# Patient Record
Sex: FEMALE | Race: WHITE | NOT HISPANIC OR LATINO | Employment: UNEMPLOYED | URBAN - NONMETROPOLITAN AREA
[De-identification: names, ages, dates, MRNs, and addresses within clinical notes are randomized per-mention and may not be internally consistent; named-entity substitution may affect disease eponyms.]

---

## 2017-06-22 ENCOUNTER — TRANSCRIBE ORDERS (OUTPATIENT)
Dept: MAMMOGRAPHY | Facility: HOSPITAL | Age: 67
End: 2017-06-22

## 2017-06-22 DIAGNOSIS — Z13.9 SCREENING: Primary | ICD-10-CM

## 2017-07-12 ENCOUNTER — HOSPITAL ENCOUNTER (OUTPATIENT)
Dept: MAMMOGRAPHY | Facility: HOSPITAL | Age: 67
Discharge: HOME | End: 2017-07-12
Admitting: NURSE PRACTITIONER

## 2017-07-12 DIAGNOSIS — Z13.9 SCREENING: ICD-10-CM

## 2017-07-12 PROCEDURE — G0202 SCR MAMMO BI INCL CAD: HCPCS

## 2017-07-12 PROCEDURE — 77063 BREAST TOMOSYNTHESIS BI: CPT

## 2017-10-30 ENCOUNTER — TRANSCRIBE ORDERS (OUTPATIENT)
Dept: GENERAL RADIOLOGY | Facility: HOSPITAL | Age: 67
End: 2017-10-30

## 2017-10-30 DIAGNOSIS — N95.1 MENOPAUSAL STATE: Primary | ICD-10-CM

## 2017-11-09 ENCOUNTER — APPOINTMENT (OUTPATIENT)
Dept: BONE DENSITY | Facility: HOSPITAL | Age: 67
End: 2017-11-09

## 2017-11-09 DIAGNOSIS — N95.1 MENOPAUSAL STATE: ICD-10-CM

## 2017-11-09 PROCEDURE — 77080 DXA BONE DENSITY AXIAL: CPT

## 2018-05-29 ENCOUNTER — TELEPHONE (OUTPATIENT)
Dept: SURGERY | Facility: CLINIC | Age: 68
End: 2018-05-29

## 2018-06-14 ENCOUNTER — PREP FOR SURGERY (OUTPATIENT)
Dept: OTHER | Facility: HOSPITAL | Age: 68
End: 2018-06-14

## 2018-06-14 DIAGNOSIS — Z12.11 COLON CANCER SCREENING: Primary | ICD-10-CM

## 2018-06-14 RX ORDER — SODIUM CHLORIDE 0.9 % (FLUSH) 0.9 %
1-10 SYRINGE (ML) INJECTION AS NEEDED
Status: CANCELLED | OUTPATIENT
Start: 2018-06-14

## 2018-06-14 RX ORDER — POLYETHYLENE GLYCOL 3350 17 G/17G
238 POWDER, FOR SOLUTION ORAL ONCE
Qty: 238 G | Refills: 0 | Status: SHIPPED | OUTPATIENT
Start: 2018-06-14 | End: 2018-06-15

## 2018-06-14 RX ORDER — SODIUM CHLORIDE, SODIUM LACTATE, POTASSIUM CHLORIDE, CALCIUM CHLORIDE 600; 310; 30; 20 MG/100ML; MG/100ML; MG/100ML; MG/100ML
50 INJECTION, SOLUTION INTRAVENOUS CONTINUOUS
Status: CANCELLED | OUTPATIENT
Start: 2018-06-14

## 2018-06-14 RX ORDER — BISACODYL 5 MG/1
TABLET, DELAYED RELEASE ORAL
Qty: 4 TABLET | Refills: 0 | Status: SHIPPED | OUTPATIENT
Start: 2018-06-14 | End: 2018-06-18 | Stop reason: HOSPADM

## 2018-06-15 PROBLEM — Z12.11 COLON CANCER SCREENING: Status: ACTIVE | Noted: 2018-06-15

## 2018-06-18 ENCOUNTER — ANESTHESIA EVENT (OUTPATIENT)
Dept: GASTROENTEROLOGY | Facility: HOSPITAL | Age: 68
End: 2018-06-18

## 2018-06-18 ENCOUNTER — ANESTHESIA (OUTPATIENT)
Dept: GASTROENTEROLOGY | Facility: HOSPITAL | Age: 68
End: 2018-06-18

## 2018-06-18 ENCOUNTER — HOSPITAL ENCOUNTER (OUTPATIENT)
Facility: HOSPITAL | Age: 68
Setting detail: HOSPITAL OUTPATIENT SURGERY
Discharge: HOME | End: 2018-06-18
Attending: SURGERY | Admitting: SURGERY

## 2018-06-18 VITALS
HEIGHT: 65 IN | TEMPERATURE: 97.9 F | DIASTOLIC BLOOD PRESSURE: 72 MMHG | RESPIRATION RATE: 18 BRPM | HEART RATE: 63 BPM | WEIGHT: 197 LBS | SYSTOLIC BLOOD PRESSURE: 125 MMHG | OXYGEN SATURATION: 96 % | BODY MASS INDEX: 32.82 KG/M2

## 2018-06-18 DIAGNOSIS — Z12.11 COLON CANCER SCREENING: ICD-10-CM

## 2018-06-18 LAB — GLUCOSE BLDC GLUCOMTR-MCNC: 119 MG/DL (ref 70–130)

## 2018-06-18 PROCEDURE — 25010000002 PROPOFOL 200 MG/20ML EMULSION: Performed by: NURSE ANESTHETIST, CERTIFIED REGISTERED

## 2018-06-18 PROCEDURE — G0105 COLORECTAL SCRN; HI RISK IND: HCPCS | Performed by: SURGERY

## 2018-06-18 PROCEDURE — S0260 H&P FOR SURGERY: HCPCS | Performed by: SURGERY

## 2018-06-18 PROCEDURE — 82962 GLUCOSE BLOOD TEST: CPT

## 2018-06-18 RX ORDER — SODIUM CHLORIDE 0.9 % (FLUSH) 0.9 %
1-10 SYRINGE (ML) INJECTION AS NEEDED
Status: DISCONTINUED | OUTPATIENT
Start: 2018-06-18 | End: 2018-06-18 | Stop reason: HOSPADM

## 2018-06-18 RX ORDER — SODIUM CHLORIDE, SODIUM LACTATE, POTASSIUM CHLORIDE, CALCIUM CHLORIDE 600; 310; 30; 20 MG/100ML; MG/100ML; MG/100ML; MG/100ML
50 INJECTION, SOLUTION INTRAVENOUS CONTINUOUS
Status: DISCONTINUED | OUTPATIENT
Start: 2018-06-18 | End: 2018-06-18 | Stop reason: HOSPADM

## 2018-06-18 RX ORDER — PROPOFOL 10 MG/ML
INJECTION, EMULSION INTRAVENOUS AS NEEDED
Status: DISCONTINUED | OUTPATIENT
Start: 2018-06-18 | End: 2018-06-18 | Stop reason: SURG

## 2018-06-18 RX ADMIN — PROPOFOL 30 MG: 10 INJECTION, EMULSION INTRAVENOUS at 13:05

## 2018-06-18 RX ADMIN — PROPOFOL 30 MG: 10 INJECTION, EMULSION INTRAVENOUS at 13:02

## 2018-06-18 RX ADMIN — PROPOFOL 30 MG: 10 INJECTION, EMULSION INTRAVENOUS at 12:56

## 2018-06-18 RX ADMIN — PROPOFOL 30 MG: 10 INJECTION, EMULSION INTRAVENOUS at 12:47

## 2018-06-18 RX ADMIN — PROPOFOL 30 MG: 10 INJECTION, EMULSION INTRAVENOUS at 12:38

## 2018-06-18 RX ADMIN — PROPOFOL 30 MG: 10 INJECTION, EMULSION INTRAVENOUS at 12:52

## 2018-06-18 RX ADMIN — PROPOFOL 30 MG: 10 INJECTION, EMULSION INTRAVENOUS at 12:44

## 2018-06-18 RX ADMIN — PROPOFOL 30 MG: 10 INJECTION, EMULSION INTRAVENOUS at 12:59

## 2018-06-18 RX ADMIN — PROPOFOL 30 MG: 10 INJECTION, EMULSION INTRAVENOUS at 12:41

## 2018-06-18 RX ADMIN — PROPOFOL 30 MG: 10 INJECTION, EMULSION INTRAVENOUS at 12:42

## 2018-06-18 RX ADMIN — PROPOFOL 30 MG: 10 INJECTION, EMULSION INTRAVENOUS at 12:45

## 2018-06-18 RX ADMIN — PROPOFOL 30 MG: 10 INJECTION, EMULSION INTRAVENOUS at 12:46

## 2018-06-18 RX ADMIN — SODIUM CHLORIDE, POTASSIUM CHLORIDE, SODIUM LACTATE AND CALCIUM CHLORIDE 50 ML/HR: 600; 310; 30; 20 INJECTION, SOLUTION INTRAVENOUS at 11:18

## 2018-06-18 RX ADMIN — PROPOFOL 30 MG: 10 INJECTION, EMULSION INTRAVENOUS at 12:40

## 2018-06-18 RX ADMIN — PROPOFOL 30 MG: 10 INJECTION, EMULSION INTRAVENOUS at 12:49

## 2018-06-18 RX ADMIN — PROPOFOL 30 MG: 10 INJECTION, EMULSION INTRAVENOUS at 12:43

## 2018-06-18 NOTE — ANESTHESIA POSTPROCEDURE EVALUATION
Patient: Annette Seo    Procedure Summary     Date:  06/18/18 Room / Location:  [facility] ENDOSCOPY 3 / [facility] ENDOSCOPY    Anesthesia Start:  1234 Anesthesia Stop:  1311    Procedure:  COLONOSCOPY (N/A ) Diagnosis:       Colon cancer screening      (Colon cancer screening [Z12.11])    Surgeon:  Priti Bell MD Provider:  Clifton Melchor CRNA    Anesthesia Type:  MAC ASA Status:  3          Anesthesia Type: MAC  Last vitals  BP   118/70 (06/18/18 1112)   Temp   97.8 °F (36.6 °C) (06/18/18 1112)   Pulse   65 (06/18/18 1112)   Resp   18 (06/18/18 1112)     SpO2   98 % (06/18/18 1112)     Post Anesthesia Care and Evaluation    Patient location during evaluation: PHASE II  Patient participation: complete - patient participated  Level of consciousness: awake and alert  Pain management: satisfactory to patient  Airway patency: patent  Anesthetic complications: No anesthetic complications    Cardiovascular status: acceptable and stable  Respiratory status: acceptable  Hydration status: acceptable

## 2018-06-18 NOTE — ANESTHESIA POSTPROCEDURE EVALUATION
Patient: Annette Seo    Procedure Summary     Date:  06/18/18 Room / Location:  [facility] ENDOSCOPY 3 / [facility] ENDOSCOPY    Anesthesia Start:  1234 Anesthesia Stop:  1311    Procedure:  COLONOSCOPY (N/A ) Diagnosis:       Colon cancer screening      (Colon cancer screening [Z12.11])    Surgeon:  Priti Bell MD Provider:  Clifton Melchor CRNA    Anesthesia Type:  MAC ASA Status:  3          Anesthesia Type: MAC  Last vitals  BP   118/70 (06/18/18 1112)   Temp   97.8 °F (36.6 °C) (06/18/18 1112)   Pulse   65 (06/18/18 1112)   Resp   18 (06/18/18 1112)     SpO2   98 % (06/18/18 1112)     Anesthesia Post Evaluation

## 2018-06-18 NOTE — ANESTHESIA PREPROCEDURE EVALUATION
Anesthesia Evaluation     Patient summary reviewed and Nursing notes reviewed   no history of anesthetic complications:  NPO Solid Status: > 8 hours  NPO Liquid Status: > 8 hours           Airway   Dental      Pulmonary    (+) a smoker,   Cardiovascular     PT is on anticoagulation therapy  Patient on routine beta blocker    (+) hyperlipidemia,       Neuro/Psych  (+) neuromuscular disease, psychiatric history Anxiety and Depression,     GI/Hepatic/Renal/Endo    (+)  hiatal hernia, GERD,  diabetes mellitus,     Musculoskeletal     Abdominal    Substance History      OB/GYN          Other                        Anesthesia Plan    ASA 3     MAC     intravenous induction

## 2018-07-10 ENCOUNTER — TRANSCRIBE ORDERS (OUTPATIENT)
Dept: ADMINISTRATIVE | Facility: HOSPITAL | Age: 68
End: 2018-07-10

## 2018-07-10 DIAGNOSIS — Z12.39 BREAST CANCER SCREENING: Primary | ICD-10-CM

## 2018-08-15 ENCOUNTER — HOSPITAL ENCOUNTER (OUTPATIENT)
Dept: MAMMOGRAPHY | Facility: HOSPITAL | Age: 68
Discharge: HOME | End: 2018-08-15
Admitting: NURSE PRACTITIONER

## 2018-08-15 DIAGNOSIS — Z12.39 BREAST CANCER SCREENING: ICD-10-CM

## 2018-08-15 PROCEDURE — 77063 BREAST TOMOSYNTHESIS BI: CPT

## 2018-08-15 PROCEDURE — 77067 SCR MAMMO BI INCL CAD: CPT

## 2019-10-21 ENCOUNTER — TRANSCRIBE ORDERS (OUTPATIENT)
Dept: ADMINISTRATIVE | Facility: HOSPITAL | Age: 69
End: 2019-10-21

## 2019-10-21 DIAGNOSIS — Z12.39 SCREENING FOR BREAST CANCER: Primary | ICD-10-CM

## 2019-10-22 ENCOUNTER — HOSPITAL ENCOUNTER (OUTPATIENT)
Dept: MAMMOGRAPHY | Facility: HOSPITAL | Age: 69
Discharge: HOME | End: 2019-10-22
Admitting: NURSE PRACTITIONER

## 2019-10-22 DIAGNOSIS — Z12.39 SCREENING FOR BREAST CANCER: ICD-10-CM

## 2019-10-22 PROCEDURE — 77063 BREAST TOMOSYNTHESIS BI: CPT

## 2019-10-22 PROCEDURE — 77067 SCR MAMMO BI INCL CAD: CPT

## 2020-11-02 ENCOUNTER — TRANSCRIBE ORDERS (OUTPATIENT)
Dept: ADMINISTRATIVE | Facility: HOSPITAL | Age: 70
End: 2020-11-02

## 2020-11-02 DIAGNOSIS — Z12.31 SCREENING MAMMOGRAM, ENCOUNTER FOR: Primary | ICD-10-CM

## 2020-12-18 ENCOUNTER — TRANSCRIBE ORDERS (OUTPATIENT)
Dept: ADMINISTRATIVE | Facility: HOSPITAL | Age: 70
End: 2020-12-18

## 2020-12-18 DIAGNOSIS — Z12.31 VISIT FOR SCREENING MAMMOGRAM: Primary | ICD-10-CM

## 2021-02-09 ENCOUNTER — HOSPITAL ENCOUNTER (OUTPATIENT)
Dept: MAMMOGRAPHY | Facility: HOSPITAL | Age: 71
Discharge: HOME | End: 2021-02-09
Admitting: NURSE PRACTITIONER

## 2021-02-09 DIAGNOSIS — Z12.31 VISIT FOR SCREENING MAMMOGRAM: ICD-10-CM

## 2021-02-09 PROCEDURE — 77067 SCR MAMMO BI INCL CAD: CPT

## 2021-02-09 PROCEDURE — 77063 BREAST TOMOSYNTHESIS BI: CPT

## 2022-01-14 ENCOUNTER — PREP FOR SURGERY (OUTPATIENT)
Dept: OTHER | Facility: HOSPITAL | Age: 72
End: 2022-01-14

## 2022-01-14 ENCOUNTER — TELEPHONE (OUTPATIENT)
Dept: SURGERY | Facility: CLINIC | Age: 72
End: 2022-01-14

## 2022-01-14 DIAGNOSIS — Z12.11 COLON CANCER SCREENING: Primary | ICD-10-CM

## 2022-01-14 RX ORDER — POLYETHYLENE GLYCOL 3350 17 G/17G
POWDER, FOR SOLUTION ORAL
Qty: 238 G | Refills: 0 | Status: SHIPPED | OUTPATIENT
Start: 2022-01-14

## 2022-01-14 RX ORDER — BISACODYL 5 MG
TABLET, DELAYED RELEASE (ENTERIC COATED) ORAL
Qty: 4 TABLET | Refills: 0 | Status: SHIPPED | OUTPATIENT
Start: 2022-01-14

## 2022-01-14 NOTE — TELEPHONE ENCOUNTER
PRESCREENING FOR OPEN ACCESS SCHEDULING    Annette Seo, 1950  [id number]    01/14/22    If, the patient answers yes to any of the following questions the provider will be informed prior to scheduling open access for approval and documented in the chart.    [x]  Yes  [] No    1. Have you ever had a colonoscopy in the past?      When:  2018      Where:       Polyps or other:     []  Yes  [x] No    2. Family history of colon cancer?      Relation:       Age of onset:       Do you currently have any of the following?    []  Yes  [x] No  Rectal bleeding, if so, how long?     []  Yes  [x] No  Abdominal pain, if so, how long?    []  Yes  [x] No  Constipation, if so, how long?    []  Yes  [x] No  Diarrhea, if so, how long?    []  Yes  [x] No  Weight loss, is so, how much?    [] Yes  [x] No  Small caliber stool, if so, how long?      Have you ever had any of the following conditions?    [] Yes  [x] No  Heart attack?      When?       Last cardiac workup?     Blood thinners?    [] Yes  [x] No   Lung problems, asthma or COPD?  [] Yes  [x] No  Oxygen required?       [] Yes  [x] No  Stroke?     [] Yes  [x] No  Have you ever had a reaction to anesthesia?

## 2022-01-14 NOTE — TELEPHONE ENCOUNTER
Pt scheduled at [facility] on 1/31/22 for colonoscopy. Instructions mailed, prep sent in.

## 2022-01-25 ENCOUNTER — TELEPHONE (OUTPATIENT)
Dept: SURGERY | Facility: CLINIC | Age: 72
End: 2022-01-25

## 2022-01-25 NOTE — TELEPHONE ENCOUNTER
waylon for the pt to call and reschedule her colonoscopy to [facility] or [facility]

## 2022-01-28 ENCOUNTER — TRANSCRIBE ORDERS (OUTPATIENT)
Dept: ADMINISTRATIVE | Facility: HOSPITAL | Age: 72
End: 2022-01-28

## 2022-01-28 DIAGNOSIS — Z12.31 ENCOUNTER FOR SCREENING MAMMOGRAM FOR BREAST CANCER: Primary | ICD-10-CM

## 2022-02-25 ENCOUNTER — HOSPITAL ENCOUNTER (OUTPATIENT)
Dept: MAMMOGRAPHY | Facility: HOSPITAL | Age: 72
Discharge: HOME | End: 2022-02-25
Admitting: NURSE PRACTITIONER

## 2022-02-25 DIAGNOSIS — Z12.31 ENCOUNTER FOR SCREENING MAMMOGRAM FOR BREAST CANCER: ICD-10-CM

## 2022-02-25 PROCEDURE — 77067 SCR MAMMO BI INCL CAD: CPT

## 2022-02-25 PROCEDURE — 77063 BREAST TOMOSYNTHESIS BI: CPT

## 2022-03-02 ENCOUNTER — TELEPHONE (OUTPATIENT)
Dept: SURGERY | Facility: CLINIC | Age: 72
End: 2022-03-02

## 2022-03-03 NOTE — PRE-PROCEDURE INSTRUCTIONS
PAT phone history completed with pt for upcoming procedure on 3/7/22 with Dr. Dobbins.    PAT PASS GIVEN/REVIEWED WITH PT.  VERBALIZED UNDERSTANDING OF THE FOLLOWING:  DO NOT EAT, DRINK, SMOKE, USE SMOKELESS TOBACCO OR CHEW GUM AFTER MIDNIGHT THE NIGHT BEFORE SURGERY.  THIS ALSO INCLUDES HARD CANDIES AND MINTS.    DO NOT SHAVE THE AREA TO BE OPERATED ON AT LEAST 48 HOURS PRIOR TO THE PROCEDURE.  DO NOT WEAR MAKE UP OR NAIL POLISH.  DO NOT LEAVE IN ANY PIERCING OR WEAR JEWELRY THE DAY OF SURGERY.      DO NOT USE ADHESIVES IF YOU WEAR DENTURES.    DO NOT WEAR EYE CONTACTS; BRING IN YOUR GLASSES.    ONLY TAKE MEDICATION THE MORNING OF YOUR PROCEDURE IF INSTRUCTED BY YOUR SURGEON WITH ENOUGH WATER TO SWALLOW THE MEDICATION.  IF YOUR SURGEON DID NOT SPECIFY WHICH MEDICATIONS TO TAKE, YOU WILL NEED TO CALL THEIR OFFICE FOR FURTHER INSTRUCTIONS AND DO AS THEY INSTRUCT.    LEAVE ANYTHING YOU CONSIDER VALUABLE AT HOME.    YOU WILL NEED TO ARRANGE FOR SOMEONE TO DRIVE YOU HOME AFTER SURGERY.  IT IS RECOMMENDED THAT YOU DO NOT DRIVE, WORK, DRINK ALCOHOL OR MAKE MAJOR DECISIONS FOR AT LEAST 24 HOURS AFTER YOUR PROCEDURE IS COMPLETE.      THE DAY OF YOUR PROCEDURE, BRING IN THE FOLLOWING IF APPLICABLE:   PICTURE ID AND INSURANCE/MEDICARE OR MEDICAID CARDS/ANY CO-PAY THAT MAY BE DUE   COPY OF ADVANCED DIRECTIVE/LIVING WILL/POWER OR    CPAP/BIPAP/INHALERS   SKIN PREP SHEET   YOUR PREADMISSION TESTING PASS (IF NOT A PHONE HISTORY)

## 2022-03-07 ENCOUNTER — ANESTHESIA (OUTPATIENT)
Dept: GASTROENTEROLOGY | Facility: HOSPITAL | Age: 72
End: 2022-03-07

## 2022-03-07 ENCOUNTER — ANESTHESIA EVENT (OUTPATIENT)
Dept: GASTROENTEROLOGY | Facility: HOSPITAL | Age: 72
End: 2022-03-07

## 2022-03-07 ENCOUNTER — HOSPITAL ENCOUNTER (OUTPATIENT)
Facility: HOSPITAL | Age: 72
Setting detail: HOSPITAL OUTPATIENT SURGERY
Discharge: HOME | End: 2022-03-07
Attending: SURGERY | Admitting: SURGERY

## 2022-03-07 VITALS
BODY MASS INDEX: 29.99 KG/M2 | RESPIRATION RATE: 18 BRPM | SYSTOLIC BLOOD PRESSURE: 96 MMHG | OXYGEN SATURATION: 96 % | HEART RATE: 60 BPM | DIASTOLIC BLOOD PRESSURE: 46 MMHG | HEIGHT: 65 IN | TEMPERATURE: 97.2 F | WEIGHT: 180 LBS

## 2022-03-07 PROCEDURE — 25010000002 PROPOFOL 10 MG/ML EMULSION: Performed by: NURSE ANESTHETIST, CERTIFIED REGISTERED

## 2022-03-07 PROCEDURE — S0260 H&P FOR SURGERY: HCPCS | Performed by: SURGERY

## 2022-03-07 RX ORDER — PROPOFOL 10 MG/ML
VIAL (ML) INTRAVENOUS AS NEEDED
Status: DISCONTINUED | OUTPATIENT
Start: 2022-03-07 | End: 2022-03-07 | Stop reason: SURG

## 2022-03-07 RX ORDER — SODIUM CHLORIDE 0.9 % (FLUSH) 0.9 %
10 SYRINGE (ML) INJECTION AS NEEDED
Status: DISCONTINUED | OUTPATIENT
Start: 2022-03-07 | End: 2022-03-07 | Stop reason: HOSPADM

## 2022-03-07 RX ORDER — LIDOCAINE HYDROCHLORIDE 20 MG/ML
INJECTION, SOLUTION EPIDURAL; INFILTRATION; INTRACAUDAL; PERINEURAL AS NEEDED
Status: DISCONTINUED | OUTPATIENT
Start: 2022-03-07 | End: 2022-03-07 | Stop reason: SURG

## 2022-03-07 RX ORDER — SODIUM CHLORIDE, SODIUM LACTATE, POTASSIUM CHLORIDE, CALCIUM CHLORIDE 600; 310; 30; 20 MG/100ML; MG/100ML; MG/100ML; MG/100ML
1000 INJECTION, SOLUTION INTRAVENOUS CONTINUOUS
Status: DISCONTINUED | OUTPATIENT
Start: 2022-03-07 | End: 2022-03-07 | Stop reason: HOSPADM

## 2022-03-07 RX ORDER — ONDANSETRON 2 MG/ML
4 INJECTION INTRAMUSCULAR; INTRAVENOUS ONCE AS NEEDED
Status: DISCONTINUED | OUTPATIENT
Start: 2022-03-07 | End: 2022-03-07 | Stop reason: HOSPADM

## 2022-03-07 RX ORDER — MAGNESIUM HYDROXIDE 1200 MG/15ML
LIQUID ORAL AS NEEDED
Status: DISCONTINUED | OUTPATIENT
Start: 2022-03-07 | End: 2022-03-07 | Stop reason: HOSPADM

## 2022-03-07 RX ADMIN — PROPOFOL 250 MG: 10 INJECTION, EMULSION INTRAVENOUS at 10:44

## 2022-03-07 RX ADMIN — SODIUM CHLORIDE, POTASSIUM CHLORIDE, SODIUM LACTATE AND CALCIUM CHLORIDE 1000 ML: 600; 310; 30; 20 INJECTION, SOLUTION INTRAVENOUS at 10:36

## 2022-03-07 RX ADMIN — LIDOCAINE HYDROCHLORIDE 40 MG: 20 INJECTION, SOLUTION EPIDURAL; INFILTRATION; INTRACAUDAL; PERINEURAL at 10:43

## 2022-03-07 NOTE — H&P
Reason for Consultation:  Screening colonoscopy / hx of colon polyps    Chief complaint :  Screening colonoscopy \  hx of colon polyps    SUBJECTIVE:    History of present illness:  I did see the patient today as a consultation for evaluation and treatment of a need for screening colonoscopy.  There are no other complaints of other than a previous history of colon polyps.  Last Interior 5 years ago.  No symptoms.    Review of Systems:    Review of Systems - General ROS: negative for - chills, fatigue, fever, hot flashes, malaise or night sweats  Psychological ROS: negative for - behavioral disorder, disorientation, hallucinations, hostility or mood swings  ENT ROS: negative for - nasal polyps, oral lesions, sinus pain, sneezing or sore throat  Breast ROS: negative for - galactorrhea or new or changing breast lumps  Respiratory ROS: negative for - hemoptysis, orthopnea, pleuritic pain, sputum changes or stridor  Cardiovascular ROS: negative for - dyspnea on exertion, edema, irregular heartbeat, murmur, orthopnea, palpitations or rapid heart rate  Gastrointestinal ROS: negative for - change in stools, gas/bloating, hematemesis, melena or stool incontinence.  Genito-Urinary ROS: negative for - dysuria, genital ulcers, nocturia or pelvic pain  Musculoskeletal ROS: negative for - gait disturbance or muscle pain  Neurological ROS: negative for - dizziness, gait disturbance, memory loss, numbness/tingling or seizures      Allergies:  No Known Allergies    Medications:    Current Facility-Administered Medications:   •  lactated ringers infusion 1,000 mL, 1,000 mL, Intravenous, Continuous, Oc Dobbins MD, Last Rate: 25 mL/hr at 03/07/22 1036, 1,000 mL at 03/07/22 1036  •  sodium chloride 0.9 % flush 10 mL, 10 mL, Intravenous, PRN, Oc Dobbins MD    History:  Past Medical History:   Diagnosis Date   • Acid reflux    • Anxiety and depression    • Cataracts, bilateral     SMALL ONES AT THIS TIME   • Colon polyp    •  Colon polyps     16 LAST COLONOSCOPY   • Constipation    • Depression    • Diabetes (HCC)     BORDERLINE   • Full dentures    • Hiatal hernia    • High cholesterol    • Hypertension        Past Surgical History:   Procedure Laterality Date   • CHOLECYSTECTOMY     • COLONOSCOPY  2016   • COLONOSCOPY N/A 6/18/2018    Procedure: COLONOSCOPY;  Surgeon: Priti Bell MD;  Location: [facility] ENDOSCOPY;  Service: Gastroenterology   • ENDOSCOPY     • HYSTERECTOMY      COMPLETE   • OOPHORECTOMY     • TEETH EXTRACTION     • TUBAL ABDOMINAL LIGATION         Family History   Problem Relation Age of Onset   • Heart disease Mother    • Diabetes Mother    • Heart disease Father    • Cancer Sister         lung   • Diabetes Sister    • Diabetes Brother        Social History     Tobacco Use   • Smoking status: Never Smoker   • Smokeless tobacco: Never Used   Vaping Use   • Vaping Use: Never used   Substance Use Topics   • Alcohol use: No   • Drug use: No        OBJECTIVE:    Vital Signs   Temp:  [97.7 °F (36.5 °C)] 97.7 °F (36.5 °C)  Heart Rate:  [60] 60  Resp:  [16] 16  BP: (113)/(71) 113/71    Physical Exam:     General Appearance:    Alert, cooperative, in no acute distress   Head:    Normocephalic, without obvious abnormality, atraumatic   Eyes:            Lids and lashes normal, conjunctivae and sclerae normal, no   icterus, no pallor, corneas clear, PERRLA   Ears:    Ears appear intact with no abnormalities noted   Throat:   No oral lesions, no thrush, oral mucosa moist   Neck:   No adenopathy, supple, trachea midline, no thyromegaly, no   carotid bruit, no JVD   Back:     No kyphosis present, no scoliosis present, no skin lesions,      erythema or scars, no tenderness to percussion or                   palpation,   range of motion normal   Lungs:     Clear to auscultation,respirations regular, even and                  unlabored    Heart:    Regular rhythm and normal rate, normal S1 and S2, no            murmur,  no gallop, no rub, no click   Chest Wall:    No abnormalities observed   Abdomen:     Normal bowel sounds, no masses, no organomegaly, soft        non-tender, non-distended, no guarding, there is no evidence of tenderness   Extremities:   Moves all extremities well, no edema, no cyanosis, no             redness   Pulses:   Pulses palpable and equal bilaterally   Skin:   No bleeding, bruising or rash   Lymph nodes:   No palpable adenopathy   Neurologic:   Cranial nerves 2 - 12 grossly intact, sensation intact, DTR       present and equal bilaterally           ASSESSMENT/PLAN:    Screening colonoscopy  History of colon polyps      I did have a detailed and extensive discussion with the patient in the office today.  The full risks and benefits of operative versus nonoperative intervention were discussed with the paient, they understand, agree, and wish to proceed with the surgical treatment plan of colonoscopy.      Oc Dobbins MD

## 2022-03-07 NOTE — ANESTHESIA POSTPROCEDURE EVALUATION
Patient: Annette Seo    Procedure Summary     Date: 03/07/22 Room / Location: [facility] ENDOSCOPY 3 / [facility] ENDOSCOPY    Anesthesia Start: 1040 Anesthesia Stop: 1102    Procedure: COLONOSCOPY (N/A ) Diagnosis:       Colon cancer screening      (Colon cancer screening [Z12.11])    Surgeons: Oc Dobbins MD Provider: Dinesh Seaman CRNA    Anesthesia Type: MAC ASA Status: 3          Anesthesia Type: MAC    Vitals  Vitals Value Taken Time   BP 90/46 03/07/22 1106   Temp 97.2 °F (36.2 °C) 03/07/22 1106   Pulse 63 03/07/22 1106   Resp 16 03/07/22 1106   SpO2 94 % 03/07/22 1106           Post Anesthesia Care and Evaluation    Patient location during evaluation: PHASE II  Patient participation: complete - patient participated  Level of consciousness: awake  Pain score: 0  Pain management: adequate  Airway patency: patent  Anesthetic complications: No anesthetic complications  PONV Status: none  Cardiovascular status: acceptable  Respiratory status: acceptable  Hydration status: acceptable  No anesthesia care post op

## 2022-03-07 NOTE — ANESTHESIA PREPROCEDURE EVALUATION
Anesthesia Evaluation     Patient summary reviewed and Nursing notes reviewed   no history of anesthetic complications:  NPO Solid Status: > 8 hours  NPO Liquid Status: > 8 hours           Airway   Mallampati: II  TM distance: >3 FB  Neck ROM: full  no difficulty expected  Dental    (+) upper dentures    Pulmonary - normal exam   Cardiovascular - normal exam  Exercise tolerance: good (4-7 METS)    PT is on anticoagulation therapy  Patient on routine beta blocker  Rhythm: regular  Rate: normal    (+) hypertension 2 medications or greater, hyperlipidemia,       Neuro/Psych  (+) neuromuscular disease, psychiatric history Anxiety and Depression,    GI/Hepatic/Renal/Endo    (+)  hiatal hernia, GERD,  diabetes mellitus,     Musculoskeletal     Abdominal    Substance History      OB/GYN          Other                        Anesthesia Plan    ASA 3     MAC     intravenous induction

## 2022-03-07 NOTE — EXTERNAL PATIENT INSTRUCTIONS
Patient Education   Table of Contents       Colonoscopy, Adult, Care After     To view videos and all your education online visit,   [email or web address]   or scan this QR code with your smartphone.                  Colonoscopy, Adult, Care After     This sheet gives you information about how to care for yourself after your procedure. Your health care provider may also give you more specific instructions. If you have problems or questions, contact your health care provider.   What can I expect after the procedure?    After the procedure, it is common to have:       A small amount of blood in your stool for 24 hours after the procedure.       Some gas.       Mild cramping or bloating of your abdomen.     Follow these instructions at home:   Eating and drinking            Drink enough fluid to keep your urine pale yellow.       Follow instructions from your health care provider about eating or drinking restrictions.       Resume your normal diet as instructed by your health care provider. Avoid heavy or fried foods that are hard to digest.     Activity         Rest as told by your health care provider.       Avoid sitting for a long time without moving. Get up to take short walks every 1?2 hours. This is important to improve blood flow and breathing. Ask for help if you feel weak or unsteady.       Return to your normal activities as told by your health care provider. Ask your health care provider what activities are safe for you.     Managing cramping and bloating            Try walking around when you have cramps or feel bloated.      Apply heat to your abdomen as told by your health care provider. Use the heat source that your health care provider recommends, such as a moist heat pack or a heating pad.       Place a towel between your skin and the heat source.       Leave the heat on for 20?30 minutes.       Remove the heat if your skin turns bright red. This is especially important if you are unable to  feel pain, heat, or cold. You may have a greater risk of getting burned.       General instructions         If you were given a sedative during the procedure, it can affect you for several hours. Do not  drive or operate machinery until your health care provider says that it is safe.      For the first 24 hours after the procedure:      Do not  sign important documents.      Do not  drink alcohol.       Do your regular daily activities at a slower pace than normal.       Eat soft foods that are easy to digest.       Take over-the-counter and prescription medicines only as told by your health care provider.       Keep all follow-up visits as told by your health care provider. This is important.       Contact a health care provider if:         You have blood in your stool 2?3 days after the procedure.     Get help right away if you have:         More than a small spotting of blood in your stool.       Large blood clots in your stool.       Swelling of your abdomen.       Nausea or vomiting.       A fever.       Increasing pain in your abdomen that is not relieved with medicine.     Summary         After the procedure, it is common to have a small amount of blood in your stool. You may also have mild cramping and bloating of your abdomen.       If you were given a sedative during the procedure, it can affect you for several hours. Do not  drive or operate machinery until your health care provider says that it is safe.       Get help right away if you have a lot of blood in your stool, nausea or vomiting, a fever, or increased pain in your abdomen.     This information is not intended to replace advice given to you by your health care provider. Make sure you discuss any questions you have with your health care provider.     Document Released: 08/01/2005Document Revised: 12/11/2020Document Reviewed: 07/13/2020     [employer] Patient Education ? 2021 [employer]

## 2022-03-07 NOTE — DISCHARGE INSTRUCTIONS
To assist you in voiding:  Drink plenty of fluids  Listen to running water while attempting to void.    If you are unable to urinate and you have an uncomfortable urge to void or it has been   6 hours since you were discharged, return to the Emergency Room     No pushing, pulling, tugging,  heavy lifting, or strenuous activity.  No major decision making, driving, or drinking alcoholic beverages for 24 hours. ( due to the medications you have  received)  Always use good hand hygiene/washing techniques.  NO driving while taking pain medications.

## 2022-03-07 NOTE — ANESTHESIA PROCEDURE NOTES
Peripheral IV    Start time: 3/7/2022 10:30 AM  End time: 3/7/2022 10:31 AM  Line placed for Fluids/Medication Admin.  Performed By   CRNA: Jose Valentine CRNA  Peripheral IV Prep   Prep: ChloraPrep  Peripheral IV Procedure   Laterality:left  Location:  Hand  Catheter size: 20 G         Post Assessment   Dressing Type: tape and transparent.    IV Dressing/Site: intact, clean and dry

## 2022-08-15 ENCOUNTER — TELEPHONE (OUTPATIENT)
Dept: SURGERY | Facility: CLINIC | Age: 72
End: 2022-08-15

## 2023-08-16 ENCOUNTER — TRANSCRIBE ORDERS (OUTPATIENT)
Dept: ADMINISTRATIVE | Facility: HOSPITAL | Age: 73
End: 2023-08-16
Payer: MEDICARE

## 2023-08-16 DIAGNOSIS — Z12.31 VISIT FOR SCREENING MAMMOGRAM: Primary | ICD-10-CM

## 2023-08-18 ENCOUNTER — OFFICE VISIT (OUTPATIENT)
Dept: CARDIOLOGY | Facility: CLINIC | Age: 73
End: 2023-08-18
Payer: MEDICARE

## 2023-08-18 VITALS
DIASTOLIC BLOOD PRESSURE: 78 MMHG | HEART RATE: 61 BPM | OXYGEN SATURATION: 98 % | WEIGHT: 199 LBS | HEIGHT: 65 IN | SYSTOLIC BLOOD PRESSURE: 120 MMHG | RESPIRATION RATE: 16 BRPM | BODY MASS INDEX: 33.15 KG/M2

## 2023-08-18 DIAGNOSIS — E66.2 CLASS 1 OBESITY WITH ALVEOLAR HYPOVENTILATION WITHOUT SERIOUS COMORBIDITY WITH BODY MASS INDEX (BMI) OF 33.0 TO 33.9 IN ADULT: ICD-10-CM

## 2023-08-18 DIAGNOSIS — I10 PRIMARY HYPERTENSION: ICD-10-CM

## 2023-08-18 DIAGNOSIS — R07.9 CHEST PAIN, UNSPECIFIED TYPE: ICD-10-CM

## 2023-08-18 DIAGNOSIS — E78.5 HYPERLIPIDEMIA LDL GOAL <100: Primary | ICD-10-CM

## 2023-08-18 DIAGNOSIS — I44.7 LBBB (LEFT BUNDLE BRANCH BLOCK): ICD-10-CM

## 2023-08-18 RX ORDER — AMLODIPINE BESYLATE 5 MG/1
1 TABLET ORAL DAILY
COMMUNITY
Start: 2023-07-07

## 2023-08-18 RX ORDER — FENOFIBRATE 160 MG/1
160 TABLET ORAL DAILY
COMMUNITY
End: 2023-08-18

## 2023-08-18 RX ORDER — FAMOTIDINE 40 MG/1
40 TABLET, FILM COATED ORAL
COMMUNITY
Start: 2023-08-11

## 2023-08-18 RX ORDER — EZETIMIBE 10 MG/1
1 TABLET ORAL DAILY
COMMUNITY
Start: 2023-08-11 | End: 2023-08-18

## 2023-08-18 RX ORDER — LORATADINE 10 MG/1
1 TABLET ORAL DAILY
COMMUNITY
Start: 2023-08-11

## 2023-08-18 RX ORDER — OMEPRAZOLE 40 MG/1
40 CAPSULE, DELAYED RELEASE ORAL DAILY
COMMUNITY
Start: 2023-05-23

## 2023-08-18 RX ORDER — FLUOXETINE HYDROCHLORIDE 40 MG/1
40 CAPSULE ORAL EVERY MORNING
COMMUNITY
Start: 2023-07-07

## 2023-08-18 RX ORDER — LISINOPRIL AND HYDROCHLOROTHIAZIDE 25; 20 MG/1; MG/1
TABLET ORAL DAILY
COMMUNITY
Start: 2015-01-02

## 2023-08-18 NOTE — PROGRESS NOTES
"     [facility] Cardiology OP Consult Note    Annette Seo  [id number]  2023    Referred By: Michelle Rincon APRN    Chief Complaint: Hyperlipidemia    History of Present Illness:   Mrs. Annette Seo is a 73 y.o. female who presents to the Cardiology Clinic for evaluation of hyperlipidemia.  The patient has a past medical history significant for borderline type 2 diabetes mellitus, hypertension, hyperlipidemia, GERD, and obesity with a BMI 33.  She has a past cardiac history significant for an abnormal MPS in 2016 with a moderate defect in the basolateral wall with reversibility.  Appears no further ischemic evaluation was pursued at that time.  She presents today for further management of hyperlipidemia.  She reports a long history of elevated cholesterol levels, for which she is currently taking atorvastatin, Zetia, and fenofibrate.  Despite these medications, her most recent lipid profile showed her LDL to be 270 and triglycerides 214.  Currently, the patient additionally reports intermittent episodes of chest pain described as a \"sharp and stabbing\" sensation located in her central chest.  The episodes occur randomly, no clear exacerbating or alleviating factors.  She denies any worsening or association with exertion.  She also reports mild exertional dyspnea, which appears to be chronic.  No history of orthopnea, PND, or significant lower extremity swelling.  No other specific complaints today.    Past Cardiac Testin. Last Coronary Angio: None  2. Prior Stress Testing: MPS 2016   1.  Moderate perfusion defect of the basolateral wall with reversibility   2.  LVEF 72%  3. Last Echo: None  4. Prior Holter Monitor: None    Review of Systems:   Review of Systems   Constitutional:  Negative for activity change, appetite change, chills, diaphoresis, fatigue, fever, unexpected weight gain and unexpected weight loss.   Eyes:  Negative for blurred vision and double vision.   Respiratory:  " Positive for shortness of breath. Negative for cough, chest tightness and wheezing.    Cardiovascular:  Positive for chest pain. Negative for palpitations and leg swelling.   Gastrointestinal:  Negative for abdominal pain, anal bleeding, blood in stool and GERD.   Endocrine: Negative for cold intolerance and heat intolerance.   Genitourinary:  Negative for hematuria.   Neurological:  Negative for dizziness, syncope, weakness and light-headedness.   Hematological:  Does not bruise/bleed easily.   Psychiatric/Behavioral:  Negative for depressed mood and stress. The patient is not nervous/anxious.      Past Medical History:   Past Medical History:   Diagnosis Date    Acid reflux     Anxiety and depression     Cataracts, bilateral     SMALL ONES AT THIS TIME    Colon polyp     Colon polyps     14 LAST COLONOSCOPY    Constipation     Depression     Diabetes     BORDERLINE    Full dentures     Hiatal hernia     High cholesterol     Hypertension        Past Surgical History:   Past Surgical History:   Procedure Laterality Date    CHOLECYSTECTOMY      COLONOSCOPY  2016    COLONOSCOPY N/A 6/18/2018    Procedure: COLONOSCOPY;  Surgeon: Priti Bell MD;  Location: [facility] ENDOSCOPY;  Service: Gastroenterology    COLONOSCOPY N/A 3/7/2022    Procedure: COLONOSCOPY;  Surgeon: Oc Dobbins MD;  Location: [facility] ENDOSCOPY;  Service: Gastroenterology;  Laterality: N/A;    ENDOSCOPY      HYSTERECTOMY      COMPLETE    OOPHORECTOMY      TEETH EXTRACTION      TUBAL ABDOMINAL LIGATION         Family History:   Family History   Problem Relation Age of Onset    Heart disease Mother     Diabetes Mother     Heart disease Father     Cancer Sister         lung    Diabetes Sister     Diabetes Brother        Social History:   Social History     Socioeconomic History    Marital status:    Tobacco Use    Smoking status: Never     Passive exposure: Past ( smokes outside)    Smokeless tobacco: Never   Vaping Use     Vaping Use: Never used   Substance and Sexual Activity    Alcohol use: No    Drug use: No    Sexual activity: Defer       Medications:     Current Outpatient Medications:     amLODIPine (NORVASC) 5 MG tablet, Take 1 tablet by mouth Daily., Disp: , Rfl:     ASPIRIN LOW DOSE 81 MG EC tablet, Take 1 tablet by mouth Daily., Disp: , Rfl:     atorvastatin (LIPITOR) 80 MG tablet, Take 1 tablet by mouth Daily., Disp: , Rfl:     busPIRone (BUSPAR) 10 MG tablet, Take 1 tablet by mouth Daily., Disp: , Rfl:     famotidine (PEPCID) 40 MG tablet, Take 1 tablet by mouth every night at bedtime., Disp: , Rfl:     FLUoxetine (PROzac) 40 MG capsule, Take 1 capsule by mouth Every Morning., Disp: , Rfl:     lisinopril (PRINIVIL,ZESTRIL) 20 MG tablet, Take 1 tablet by mouth Daily., Disp: , Rfl:     lisinopril-hydrochlorothiazide (PRINZIDE,ZESTORETIC) 20-25 MG per tablet, Take  by mouth Daily., Disp: , Rfl:     loratadine (CLARITIN) 10 MG tablet, Take 1 tablet by mouth Daily., Disp: , Rfl:     metoprolol succinate XL (TOPROL-XL) 25 MG 24 hr tablet, Take 1 tablet by mouth Daily., Disp: , Rfl:     Omega-3 Fatty Acids (FISH OIL) 1000 MG capsule capsule, Take 1 capsule by mouth 2 (Two) Times a Day With Meals., Disp: , Rfl:     omeprazole (priLOSEC) 40 MG capsule, Take 1 capsule by mouth Daily. before a meal, Disp: , Rfl:     bisacodyl (bisacodyl) 5 MG EC tablet, Take 2 at 3pm and 2 at 7pm the day prior to colonoscopy., Disp: 4 tablet, Rfl: 0    citalopram (CeleXA) 40 MG tablet, Take 1 tablet by mouth Daily., Disp: , Rfl:     cyclobenzaprine (FLEXERIL) 5 MG tablet, Take 1 tablet by mouth Daily., Disp: , Rfl:     esomeprazole (NexIUM) 20 MG capsule, Take 2 capsules by mouth Every Morning Before Breakfast., Disp: , Rfl:     Evolocumab (REPATHA) solution prefilled syringe injection, Inject 1 mL under the skin into the appropriate area as directed Every 14 (Fourteen) Days., Disp: 1 mL, Rfl: 30    fluticasone (FLONASE) 50 MCG/ACT nasal spray, 1  "spray into the nostril(s) as directed by provider Daily As Needed., Disp: , Rfl:     pantoprazole (PROTONIX) 40 MG EC tablet, Take 1 tablet by mouth Daily., Disp: , Rfl:     polyethylene glycol (MIRALAX) 17 GM/SCOOP powder, Mix 238g powder with 64 oz of clear liquid. Starting at 5pm drink 80z every 10-15 minutes until consumed., Disp: 238 g, Rfl: 0    PROCTOZONE-HC 2.5 % rectal cream, Insert 1 application  into the rectum 2 (Two) Times a Day., Disp: , Rfl:     rOPINIRole (REQUIP) 1 MG tablet, Take 1 tablet by mouth Every Night., Disp: , Rfl:     Sennosides 15 MG tablet, Take 1 tablet by mouth daily as needed., Disp: , Rfl:     traZODone (DESYREL) 50 MG tablet, Take 1 tablet by mouth Every Night., Disp: , Rfl:     Allergies:   No Known Allergies    Physical Exam:  Vital Signs:   Vitals:    08/18/23 0832   BP: 120/78   BP Location: Right arm   Patient Position: Sitting   Pulse: 61   Resp: 16   SpO2: 98%   Weight: 90.3 kg (199 lb)   Height: 165.1 cm (65\")       Physical Exam  Constitutional:       General: She is not in acute distress.     Appearance: She is well-developed. She is obese. She is not diaphoretic.   HENT:      Head: Normocephalic and atraumatic.   Eyes:      General: No scleral icterus.     Pupils: Pupils are equal, round, and reactive to light.   Neck:      Trachea: No tracheal deviation.   Cardiovascular:      Rate and Rhythm: Normal rate and regular rhythm.      Heart sounds: Normal heart sounds. No murmur heard.    No friction rub. No gallop.      Comments: Normal JVD.  Pulmonary:      Effort: Pulmonary effort is normal. No respiratory distress.      Breath sounds: Normal breath sounds. No stridor. No wheezing or rales.   Chest:      Chest wall: No tenderness.   Abdominal:      General: Bowel sounds are normal. There is no distension.      Palpations: Abdomen is soft.      Tenderness: There is no abdominal tenderness. There is no guarding or rebound.   Musculoskeletal:         General: No swelling. " Normal range of motion.      Cervical back: Neck supple. No tenderness.   Lymphadenopathy:      Cervical: No cervical adenopathy.   Skin:     General: Skin is warm and dry.      Findings: No erythema.   Neurological:      General: No focal deficit present.      Mental Status: She is alert and oriented to person, place, and time.   Psychiatric:         Mood and Affect: Mood normal.         Behavior: Behavior normal.       Results Review:   I reviewed the patient's new clinical results.  I personally viewed and interpreted the patient's EKG/Telemetry data      ECG 12 Lead    Date/Time: 8/18/2023 8:55 AM  Performed by: Mamadou Ang MD  Authorized by: Mamadou Ang MD   Comparison: compared with previous ECG   Comparison to previous ECG: New left bundle branch block compared to 3/9/2016  Rhythm: sinus bradycardia  Rate: normal  Conduction: left bundle branch block    Clinical impression: abnormal EKG        Assessment / Plan:     1. Hyperlipidemia   -- Referred for further management of hyperlipidemia  --Recent lipid profile showed LDL to 70, triglycerides 214  --Will continue atorvastatin  --Discontinue Zetia and fenofibrate  --Start Repatha  --Repeat lipid profile in 6 months    2. LBBB (left bundle branch block)  --ECG today shows a left bundle branch block, appears to be new compared to 2016  -- Last ischemic evaluation with MPS in 2016 showed a moderate perfusion defect in the basolateral wall with reversibility suggestive of ischemia, however it does not appear any further ischemic evaluation was pursued  -- Will repeat MPS to further evaluate for ischemic etiology of left bundle branch block    3.  Primary hypertension  -- BP adequately controlled today    5. Class 1 obesity with body mass index (BMI) of 33.0 to 33.9 in adult  -- Weight loss advised  --Discussed referral to the dietitian, however the patient has declined at this time      Follow Up:   Return in about 4 weeks (around  9/15/2023).      Thank you for allowing me to participate in the care of your patient. Please to not hesitate to contact me with additional questions or concerns.     ANGELIA Ang MD  Interventional Cardiology   08/18/2023  08:28 EDT

## 2023-11-15 ENCOUNTER — TELEPHONE (OUTPATIENT)
Dept: CARDIOLOGY | Facility: CLINIC | Age: 73
End: 2023-11-15
Payer: MEDICARE

## 2023-11-15 NOTE — TELEPHONE ENCOUNTER
CALLED PATIENT REGARDING UPCOMING APPOINTMENT.  PATIENT DID NOT HAVE STRESS TEST THAT WAS RECOMMENDED BY DR. GRANADOS.  EXPLAINED THAT DR GRANADOS WOULD LIKE TO HAVE THE RESULTS OF THE STRESS TEST PRIOR TO HER FOLLOW UP.  PATIENT REQUESTED TO CANCEL HER FOLLOW UP APPOINTMENT, AND SAID SHE WILL CALL BACK LATER TO SCHEDULE APPOINTMENTS.

## (undated) DEVICE — MEDI-VAC NON-CONDUCTIVE SUCTION TUBING: Brand: CARDINAL HEALTH

## (undated) DEVICE — VLV SXN AIR/H2O ORCAPOD3 1P/U STRL

## (undated) DEVICE — Device

## (undated) DEVICE — LUBE JELLY PK/2.75GM STRL BX/144

## (undated) DEVICE — GLV SURG SENSICARE W/ALOE PF LF 6.5 STRL

## (undated) DEVICE — YANKAUER,BULB TIP, NO VENT: Brand: ARGYLE

## (undated) DEVICE — GLV SURG SENSICARE W/ALOE PF LF SZ6 STRL

## (undated) DEVICE — HYBRID TUBING/CAP SET FOR OLYMPUS® SCOPES: Brand: ERBE

## (undated) DEVICE — PATIENT RETURN ELECTRODE, SINGLE-USE, CONTACT QUALITY MONITORING, ADULT, WITH 9FT CORD, FOR PATIENTS WEIGING OVER 33LBS. (15KG): Brand: MEGADYNE

## (undated) DEVICE — ENDOSCOPY PORT CONNECTOR FOR OLYMPUS® SCOPES: Brand: ERBE

## (undated) DEVICE — JELLY,LUBE,STERILE,FLIP TOP,TUBE,2-OZ: Brand: MEDLINE

## (undated) DEVICE — GLV SURG SENSICARE W/ALOE PF LF 7.5 STRL